# Patient Record
(demographics unavailable — no encounter records)

---

## 2024-12-03 NOTE — REASON FOR VISIT
[Procedure: _________] : a [unfilled] procedure visit [TextEntry] : Pt is office today for excision of left posterior proximal calf lesion

## 2024-12-03 NOTE — PROCEDURE
[Nl] : None [FreeTextEntry1] : Lesion of left posterior calf, dermatofibroma [FreeTextEntry2] : Excision of dermatofibroma left posterior proximal calf, 2 layer plastic surgery repair lidocaine with epi [FreeTextEntry3] : lidocaine with epi [FreeTextEntry6] : Patient was assisted on the procedure table in prone position near the left posterior Prescription written draped in usual sterile fashion marked with a marking pen vertically with elliptical excision 2.5 cm prescription written draped in usual sterile fashion infiltrated with 1% lidocaine with 1/2000 epinephrine total 5 cc lesion excised down to subcutaneous fat small bleeding points controlled cautery.  Wound edges undermined for tension-free flat 2 layer closure with 3-0 Vicryl and 4-0 running subcuticular 4 Monocryl covered with Steri-Strips. [FreeTextEntry7] : lesion to pathology

## 2024-12-04 NOTE — REASON FOR VISIT
[Post Op: _________] : a [unfilled] post op visit [FreeTextEntry1] : suture removal 1 week after excision of Dermatofibroma left leg [TextEntry] : Patient is 6 days status post excision dermatofibroma left proximal lateral calf.  Today the wound is intact sutures removed the difficulty reinforced with Steri-Strips final pathology shows dermatofibroma clear margins benign lesion.  All this Explained to the patient questions answered wound care discussed follow-up as as needed.  She may want cosmetic upper and lower eyelid surgery should get back to me will  schedule for [place].

## 2025-02-02 NOTE — HISTORY OF PRESENT ILLNESS
[FreeTextEntry1] : Patient is here today with her mother and father to discuss upcoming cosmetic eyelid surgery.  Patient's mother had facelift and upper blepharoplasty with me last year and is extremely happy results the results are excellent.  This patient Allen also recently had an atypical nevus removed from the left lateral knee which is healed perfectly. Today the patient's chief complaint of back is excess overlapping skin of the upper eyelids and excess protruding fat compartments of the lower lids.  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  We discussed her the indications expectations and goals of cosmetic upper and lower blepharoplasty surgery lower eyelid surgery trans conjunctival fat technique.  Potential risks include bleeding infection poor scarring asymmetry and possible need for future visual surgeries.  Photographs are taken today.  They like to choose a date in the near future and will see about scheduling.  Scheduling will be at the [facility] here in [place] patient is interested in late March or early April.

## 2025-05-03 NOTE — HISTORY OF PRESENT ILLNESS
[FreeTextEntry1] : Patient is just over 3 weeks from cosmetic upper and lower blepharoplasty surgery lower lids and done trans conjunctival fat removal approach.  She healed very nicely the incisions are still healing slightly red and raised which is normal all questions answered she is extremely happy with the results postop photos were taken today and I will see her in a few months.